# Patient Record
Sex: FEMALE | Race: ASIAN | NOT HISPANIC OR LATINO
[De-identification: names, ages, dates, MRNs, and addresses within clinical notes are randomized per-mention and may not be internally consistent; named-entity substitution may affect disease eponyms.]

---

## 2018-08-29 PROBLEM — Z00.00 ENCOUNTER FOR PREVENTIVE HEALTH EXAMINATION: Status: ACTIVE | Noted: 2018-08-29

## 2018-09-06 ENCOUNTER — APPOINTMENT (OUTPATIENT)
Dept: MRI IMAGING | Facility: HOSPITAL | Age: 73
End: 2018-09-06

## 2019-05-20 ENCOUNTER — APPOINTMENT (OUTPATIENT)
Dept: CARDIOLOGY | Facility: CLINIC | Age: 74
End: 2019-05-20
Payer: MEDICARE

## 2019-05-20 VITALS
TEMPERATURE: 98.2 F | OXYGEN SATURATION: 97 % | RESPIRATION RATE: 18 BRPM | DIASTOLIC BLOOD PRESSURE: 68 MMHG | WEIGHT: 121 LBS | HEART RATE: 64 BPM | SYSTOLIC BLOOD PRESSURE: 137 MMHG

## 2019-05-20 DIAGNOSIS — I65.29 OCCLUSION AND STENOSIS OF UNSPECIFIED CAROTID ARTERY: ICD-10-CM

## 2019-05-20 DIAGNOSIS — I10 ESSENTIAL (PRIMARY) HYPERTENSION: ICD-10-CM

## 2019-05-20 DIAGNOSIS — E78.5 HYPERLIPIDEMIA, UNSPECIFIED: ICD-10-CM

## 2019-05-20 PROCEDURE — 93000 ELECTROCARDIOGRAM COMPLETE: CPT

## 2019-05-20 PROCEDURE — 99205 OFFICE O/P NEW HI 60 MIN: CPT

## 2019-05-20 RX ORDER — AMLODIPINE BESYLATE 5 MG/1
5 TABLET ORAL
Refills: 0 | Status: ACTIVE | COMMUNITY
Start: 2019-05-20

## 2019-05-20 RX ORDER — CLOPIDOGREL 75 MG/1
75 TABLET, FILM COATED ORAL
Refills: 0 | Status: ACTIVE | COMMUNITY
Start: 2019-05-20

## 2019-05-20 RX ORDER — ATORVASTATIN CALCIUM 20 MG/1
20 TABLET, FILM COATED ORAL
Refills: 0 | Status: ACTIVE | COMMUNITY
Start: 2019-05-20

## 2019-05-20 RX ORDER — CARVEDILOL 6.25 MG/1
6.25 TABLET, FILM COATED ORAL
Refills: 0 | Status: ACTIVE | COMMUNITY
Start: 2019-05-20

## 2019-05-20 NOTE — HISTORY OF PRESENT ILLNESS
[FreeTextEntry1] : 1. HTN: on medications, controlled.\par 2. Hyperlipidemia: on statin.\par 3. Carotid stenosis: Patient underwent CEA in 2017. She has not had regular care since then.\par She reports exertional SOB, moderately severe, relieved with rest occurring on a daily basis and increasing in frequency and severity. She has very limited ET due to her SOB. She also has mild bilateral leg edema and CP that is midline, substernal without radiation lasting a few minutes. The CP occurs several times/ week and is increasing in frequency and is associated with the SOB.\par She also has had worsening dizziness.

## 2019-05-20 NOTE — PHYSICAL EXAM
[General Appearance - Well Developed] : well developed [Normal Appearance] : normal appearance [Well Groomed] : well groomed [General Appearance - Well Nourished] : well nourished [No Deformities] : no deformities [General Appearance - In No Acute Distress] : no acute distress [Normal Conjunctiva] : the conjunctiva exhibited no abnormalities [Eyelids - No Xanthelasma] : the eyelids demonstrated no xanthelasmas [Normal Oral Mucosa] : normal oral mucosa [No Oral Pallor] : no oral pallor [No Oral Cyanosis] : no oral cyanosis [Normal Jugular Venous A Waves Present] : normal jugular venous A waves present [Normal Jugular Venous V Waves Present] : normal jugular venous V waves present [No Jugular Venous Penny A Waves] : no jugular venous penny A waves [Heart Rate And Rhythm] : heart rate and rhythm were normal [Heart Sounds] : normal S1 and S2 [FreeTextEntry1] : 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  [Respiration, Rhythm And Depth] : normal respiratory rhythm and effort [Exaggerated Use Of Accessory Muscles For Inspiration] : no accessory muscle use [Auscultation Breath Sounds / Voice Sounds] : lungs were clear to auscultation bilaterally [Abdomen Soft] : soft [Abdomen Tenderness] : non-tender [Abdomen Mass (___ Cm)] : no abdominal mass palpated [Nail Clubbing] : no clubbing of the fingernails [Cyanosis, Localized] : no localized cyanosis [Petechial Hemorrhages (___cm)] : no petechial hemorrhages [Skin Color & Pigmentation] : normal skin color and pigmentation [] : no rash [No Venous Stasis] : no venous stasis [Skin Lesions] : no skin lesions [No Skin Ulcers] : no skin ulcer [No Xanthoma] : no  xanthoma was observed [Oriented To Time, Place, And Person] : oriented to person, place, and time [Affect] : the affect was normal [Mood] : the mood was normal [No Anxiety] : not feeling anxious

## 2019-05-20 NOTE — REASON FOR VISIT
WDL [Initial Evaluation] : an initial evaluation of [Hyperlipidemia] : hyperlipidemia [Hypertension] : hypertension [FreeTextEntry1] : 73 HTN hyperlipidemia DM carotid stenosis s/p CEA with Cp and SOB.

## 2019-05-20 NOTE — DISCUSSION/SUMMARY
[FreeTextEntry1] : 73 F HTN hyperlipidemia DM with carotid stenosis s/p CEA with CP and SOB.\par CHECK ECHOCARDIOGRAM.\par CHECK NST TO ASSESS PERFUSION (limited ET).\par CHECK CAROTID US.\par Continue meds for  HTN and hyperlipidemia.\par Continue medications for DM.\par COndition deteriorating, see me after tests.

## 2019-07-12 ENCOUNTER — APPOINTMENT (OUTPATIENT)
Dept: CARDIOLOGY | Facility: CLINIC | Age: 74
End: 2019-07-12
Payer: MEDICARE

## 2019-07-12 DIAGNOSIS — R07.9 CHEST PAIN, UNSPECIFIED: ICD-10-CM

## 2019-07-12 PROCEDURE — A9500: CPT

## 2019-07-12 PROCEDURE — 78452 HT MUSCLE IMAGE SPECT MULT: CPT

## 2019-07-12 PROCEDURE — 93015 CV STRESS TEST SUPVJ I&R: CPT

## 2019-07-12 PROCEDURE — 93306 TTE W/DOPPLER COMPLETE: CPT

## 2019-07-22 ENCOUNTER — APPOINTMENT (OUTPATIENT)
Dept: CARDIOLOGY | Facility: CLINIC | Age: 74
End: 2019-07-22
Payer: MEDICARE

## 2019-07-22 ENCOUNTER — NON-APPOINTMENT (OUTPATIENT)
Age: 74
End: 2019-07-22

## 2019-07-22 VITALS
RESPIRATION RATE: 18 BRPM | TEMPERATURE: 97.8 F | SYSTOLIC BLOOD PRESSURE: 120 MMHG | OXYGEN SATURATION: 99 % | DIASTOLIC BLOOD PRESSURE: 65 MMHG | HEART RATE: 58 BPM | WEIGHT: 121 LBS

## 2019-07-22 PROCEDURE — 93000 ELECTROCARDIOGRAM COMPLETE: CPT

## 2019-07-22 PROCEDURE — 99214 OFFICE O/P EST MOD 30 MIN: CPT

## 2019-07-22 RX ORDER — NITROGLYCERIN 0.4 MG/1
0.4 TABLET SUBLINGUAL
Qty: 100 | Refills: 0 | Status: ACTIVE | COMMUNITY
Start: 2019-07-22 | End: 1900-01-01

## 2019-07-22 NOTE — REASON FOR VISIT
[Follow-Up - Clinic] : a clinic follow-up of [Carotid Artery Stenosis] : carotid stenosis [Hyperlipidemia] : hyperlipidemia [Hypertension] : hypertension [FreeTextEntry1] : 73 HTN hyperlipidemia DM carotid stenosis s/p CEA for f/u.

## 2019-07-22 NOTE — PHYSICAL EXAM
[General Appearance - Well Developed] : well developed [Normal Appearance] : normal appearance [Well Groomed] : well groomed [General Appearance - Well Nourished] : well nourished [No Deformities] : no deformities [General Appearance - In No Acute Distress] : no acute distress [Normal Conjunctiva] : the conjunctiva exhibited no abnormalities [Eyelids - No Xanthelasma] : the eyelids demonstrated no xanthelasmas [Normal Oral Mucosa] : normal oral mucosa [No Oral Pallor] : no oral pallor [No Oral Cyanosis] : no oral cyanosis [Normal Jugular Venous A Waves Present] : normal jugular venous A waves present [Normal Jugular Venous V Waves Present] : normal jugular venous V waves present [No Jugular Venous Penny A Waves] : no jugular venous penny A waves [Respiration, Rhythm And Depth] : normal respiratory rhythm and effort [Auscultation Breath Sounds / Voice Sounds] : lungs were clear to auscultation bilaterally [Exaggerated Use Of Accessory Muscles For Inspiration] : no accessory muscle use [Heart Rate And Rhythm] : heart rate and rhythm were normal [Heart Sounds] : normal S1 and S2 [FreeTextEntry1] : 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  [Abdomen Soft] : soft [Abdomen Tenderness] : non-tender [Abdomen Mass (___ Cm)] : no abdominal mass palpated [Nail Clubbing] : no clubbing of the fingernails [Cyanosis, Localized] : no localized cyanosis [Petechial Hemorrhages (___cm)] : no petechial hemorrhages [Skin Color & Pigmentation] : normal skin color and pigmentation [] : no rash [No Venous Stasis] : no venous stasis [Skin Lesions] : no skin lesions [No Skin Ulcers] : no skin ulcer [No Xanthoma] : no  xanthoma was observed [Affect] : the affect was normal [Oriented To Time, Place, And Person] : oriented to person, place, and time [Mood] : the mood was normal [No Anxiety] : not feeling anxious

## 2019-07-22 NOTE — HISTORY OF PRESENT ILLNESS
[FreeTextEntry1] : 1. HTN: on medications, controlled. No SE noted.\par 2. Hyperlipidemia: on statin.\par 3. Carotid stenosis: Patient underwent CEA in 2017. She has not had regular care since then.\par Echo last visit showed normal LVEF. NST shows fixed defect.\par Patient reports improvement in CP and SOB.\par Carotid US shows MARTHA 50-69%.\par

## 2019-07-22 NOTE — DISCUSSION/SUMMARY
[FreeTextEntry1] : 73 F HTN hyperlipidemia carotid stenosis s/p CEA with apical defect on NST.\par Patient reports no symptoms. Monitor for now.\par Continue ASA.\par Continue meds for HTN and hyperlipidemia.

## 2019-09-26 ENCOUNTER — APPOINTMENT (OUTPATIENT)
Dept: CARDIOLOGY | Facility: CLINIC | Age: 74
End: 2019-09-26
Payer: MEDICARE

## 2019-09-26 VITALS
HEART RATE: 74 BPM | DIASTOLIC BLOOD PRESSURE: 59 MMHG | SYSTOLIC BLOOD PRESSURE: 99 MMHG | OXYGEN SATURATION: 96 % | RESPIRATION RATE: 18 BRPM | TEMPERATURE: 97.9 F | WEIGHT: 124 LBS

## 2019-09-26 PROCEDURE — 99215 OFFICE O/P EST HI 40 MIN: CPT

## 2019-09-26 PROCEDURE — 93000 ELECTROCARDIOGRAM COMPLETE: CPT

## 2019-09-26 NOTE — REASON FOR VISIT
[Follow-Up - Clinic] : a clinic follow-up of [Hyperlipidemia] : hyperlipidemia [Hypertension] : hypertension [Carotid Artery Stenosis] : carotid stenosis [FreeTextEntry1] : 74 HTN hyperlipidemia DM carotid stenosis s/p CEA for f/u. \par  \par

## 2019-09-26 NOTE — DISCUSSION/SUMMARY
[FreeTextEntry1] : 74 F HTN hyperlipidemia carotid stenosis s/p CEA with CP consistent with progressive angina.\par REFER FOR CARDIAC CATHETERIZATION STAT.\par Continue ASA and statin for CAD.\par Continue medications for HTN and hyperlipidemia.

## 2019-09-26 NOTE — HISTORY OF PRESENT ILLNESS
[FreeTextEntry1] : 1. HTN: on medications, compliant with medications.\par 2. Hyperlipidemia: on statin. No muscle pain is noted. \par 3. Carotid stenosis: Patient underwent CEA in 2017. She has not had regular care since then.\par Echo last visit showed normal LVEF. \par Patient has had exertional CP and SOB over the last few months. She was started on medical therapy including ASA BB and statin and NG. She continues to have CP that is relieved with rest and progressively worsening. \par \par

## 2019-09-26 NOTE — PHYSICAL EXAM
[Normal Appearance] : normal appearance [General Appearance - Well Developed] : well developed [Well Groomed] : well groomed [No Deformities] : no deformities [General Appearance - Well Nourished] : well nourished [Normal Conjunctiva] : the conjunctiva exhibited no abnormalities [General Appearance - In No Acute Distress] : no acute distress [Eyelids - No Xanthelasma] : the eyelids demonstrated no xanthelasmas [No Oral Pallor] : no oral pallor [Normal Oral Mucosa] : normal oral mucosa [No Oral Cyanosis] : no oral cyanosis [Normal Jugular Venous A Waves Present] : normal jugular venous A waves present [Normal Jugular Venous V Waves Present] : normal jugular venous V waves present [No Jugular Venous Penny A Waves] : no jugular venous penny A waves [Exaggerated Use Of Accessory Muscles For Inspiration] : no accessory muscle use [Respiration, Rhythm And Depth] : normal respiratory rhythm and effort [Auscultation Breath Sounds / Voice Sounds] : lungs were clear to auscultation bilaterally [Heart Rate And Rhythm] : heart rate and rhythm were normal [Heart Sounds] : normal S1 and S2 [FreeTextEntry1] : 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  [Abdomen Soft] : soft [Abdomen Tenderness] : non-tender [Abdomen Mass (___ Cm)] : no abdominal mass palpated [Cyanosis, Localized] : no localized cyanosis [Nail Clubbing] : no clubbing of the fingernails [Petechial Hemorrhages (___cm)] : no petechial hemorrhages [] : no rash [Skin Color & Pigmentation] : normal skin color and pigmentation [No Venous Stasis] : no venous stasis [Skin Lesions] : no skin lesions [No Xanthoma] : no  xanthoma was observed [No Skin Ulcers] : no skin ulcer [Affect] : the affect was normal [Oriented To Time, Place, And Person] : oriented to person, place, and time [No Anxiety] : not feeling anxious [Mood] : the mood was normal

## 2019-09-26 NOTE — REVIEW OF SYSTEMS
[Shortness Of Breath] : shortness of breath [Chest Pain] : chest pain [Dyspnea on exertion] : dyspnea during exertion [Negative] : Heme/Lymph

## 2019-10-03 VITALS
HEART RATE: 55 BPM | RESPIRATION RATE: 16 BRPM | DIASTOLIC BLOOD PRESSURE: 66 MMHG | HEIGHT: 61 IN | SYSTOLIC BLOOD PRESSURE: 143 MMHG | TEMPERATURE: 98 F | OXYGEN SATURATION: 97 % | WEIGHT: 119.93 LBS

## 2019-10-03 RX ORDER — CHLORHEXIDINE GLUCONATE 213 G/1000ML
1 SOLUTION TOPICAL ONCE
Refills: 0 | Status: DISCONTINUED | OUTPATIENT
Start: 2019-10-04 | End: 2019-10-04

## 2019-10-03 NOTE — H&P ADULT - NSICDXPASTMEDICALHX_GEN_ALL_CORE_FT
PAST MEDICAL HISTORY:  DM (diabetes mellitus)     History of CEA (carotid endarterectomy)     HLD (hyperlipidemia)     HTN (hypertension)

## 2019-10-03 NOTE — H&P ADULT - RS GEN PE MLT RESP DETAILS PC
no intercostal retractions/no rales/good air movement/airway patent/no rhonchi/breath sounds equal/no wheezes/respirations non-labored

## 2019-10-03 NOTE — H&P ADULT - HISTORY OF PRESENT ILLNESS
74 y o m with PMH of carotid stenosis s/p CEA in 2017, HLD, HTN, DM presented to his doctor c/p CP and SOB which is relived at rest. Pt denies dizziness, syncope, Please review meds    The history was taken with help of [employer]  # [id number]    74 y o female Wol[place] Speaking Poor Historian with PMH of carotid stenosis s/p CEA in 2017, HLD, HTN, DM presented to his doctor c/p BLAND with going up less than one flight of stairs with accompanied palpitations and dizziness. Pt denies CP, syncope, PND/orthopnea, LE edema, n/v, fever/chills, blood in the stool. Pt underwent NST on 07/12/19 which indicated small mild defects in distal inferior and inferoapical walls that are fixed, possible attenuation artifacts, the myocardial perfusion is normal with EF of 70%. In light of pt's risk factors, symptoms mentioned above and abnormal NST, pt is now referred to [facility] for recommended cardiac cath with possible intervention. History was taken with help of [employer]  # [id number]    74 y o female [place] Speaking Poor Historian with PMH of carotid stenosis s/p CEA in 2017, HLD, HTN, DM who presented to his doctor c/p BLAND with going up less than one flight of stairs with accompanied palpitations and dizziness. Pt denies CP, syncope, PND/orthopnea, LE edema, n/v, fever/chills, blood in the stool. Pt underwent NST on 07/12/19 which indicated small mild defects in distal inferior and inferoapical walls that are fixed, possible attenuation artifacts, the myocardial perfusion is normal with EF of 70%. In light of pt's risk factors, symptoms mentioned above and abnormal NST, pt is now referred to [facility] for recommended cardiac cath with possible intervention.

## 2019-10-03 NOTE — H&P ADULT - ASSESSMENT
74 y o female Macedonian Speaking Poor Historian with PMH of carotid stenosis s/p CEA in 2017, HLD, HTN, DM who presents to [facility] for recommneded cardiac catheterization with possible intervention if clinically indicated, in light of pts risk factors, CCS class III anginal symptoms and abnormal NST.    -ASA 3, Mallampati 3  -Pt compliant with home Plavix 75mg, last dose yesterday. Will give pt home dose of Plavix and load with ASA 325mg prior to cath  -IVF Hydration NS @ 75cc/hr  -precath consented via [employer]  #[id number]    Risks & benefits of procedure and alternative therapy have been explained to the patient including but not limited to: allergic reaction, bleeding w/possible need for blood transfusion, infection, renal and vascular compromise, limb damage, arrhythmia, stroke, vessel dissection/perforation, Myocardial infarction, emergent CABG. Informed consent obtained and in chart.

## 2019-10-03 NOTE — H&P ADULT - NSHPLABSRESULTS_GEN_ALL_CORE
12.7   6.42  )-----------( 263      ( 04 Oct 2019 10:07 )             38.6         PT/INR - ( 04 Oct 2019 10:07 )   PT: 11.0 sec;   INR: 0.97          PTT - ( 04 Oct 2019 10:07 )  PTT:32.6 sec    CARDIAC MARKERS ( 04 Oct 2019 10:07 )  x     / x     / x     / x     / <1.0 ng/mL        EKG: sinus bradycardia, 54bpm, no ST-T changes

## 2019-10-04 ENCOUNTER — OUTPATIENT (OUTPATIENT)
Dept: OUTPATIENT SERVICES | Facility: HOSPITAL | Age: 74
LOS: 1 days | Discharge: NURSING, REHAB OR LONG-TERM CARE | End: 2019-10-04
Payer: MEDICARE

## 2019-10-04 DIAGNOSIS — Z98.890 OTHER SPECIFIED POSTPROCEDURAL STATES: Chronic | ICD-10-CM

## 2019-10-04 LAB
ALBUMIN SERPL ELPH-MCNC: 5 G/DL — SIGNIFICANT CHANGE UP (ref 3.3–5)
ALP SERPL-CCNC: 59 U/L — SIGNIFICANT CHANGE UP (ref 40–120)
ALT FLD-CCNC: 8 U/L — LOW (ref 10–45)
ANION GAP SERPL CALC-SCNC: 13 MMOL/L — SIGNIFICANT CHANGE UP (ref 5–17)
APTT BLD: 32.6 SEC — SIGNIFICANT CHANGE UP (ref 27.5–36.3)
AST SERPL-CCNC: 11 U/L — SIGNIFICANT CHANGE UP (ref 10–40)
BASOPHILS # BLD AUTO: 0.05 K/UL — SIGNIFICANT CHANGE UP (ref 0–0.2)
BASOPHILS NFR BLD AUTO: 0.8 % — SIGNIFICANT CHANGE UP (ref 0–2)
BILIRUB SERPL-MCNC: 0.7 MG/DL — SIGNIFICANT CHANGE UP (ref 0.2–1.2)
BUN SERPL-MCNC: 24 MG/DL — HIGH (ref 7–23)
CALCIUM SERPL-MCNC: 9.9 MG/DL — SIGNIFICANT CHANGE UP (ref 8.4–10.5)
CHLORIDE SERPL-SCNC: 106 MMOL/L — SIGNIFICANT CHANGE UP (ref 96–108)
CHOLEST SERPL-MCNC: 113 MG/DL — SIGNIFICANT CHANGE UP (ref 10–199)
CK MB CFR SERPL CALC: <1 NG/ML — SIGNIFICANT CHANGE UP (ref 0–6.7)
CK SERPL-CCNC: 66 U/L — SIGNIFICANT CHANGE UP (ref 25–170)
CO2 SERPL-SCNC: 24 MMOL/L — SIGNIFICANT CHANGE UP (ref 22–31)
CREAT SERPL-MCNC: 1.03 MG/DL — SIGNIFICANT CHANGE UP (ref 0.5–1.3)
CRP SERPL-MCNC: 0.03 MG/DL — SIGNIFICANT CHANGE UP (ref 0–0.4)
EOSINOPHIL # BLD AUTO: 0.08 K/UL — SIGNIFICANT CHANGE UP (ref 0–0.5)
EOSINOPHIL NFR BLD AUTO: 1.2 % — SIGNIFICANT CHANGE UP (ref 0–6)
GLUCOSE SERPL-MCNC: 128 MG/DL — HIGH (ref 70–99)
HBA1C BLD-MCNC: 7.4 % — HIGH (ref 4–5.6)
HCT VFR BLD CALC: 38.6 % — SIGNIFICANT CHANGE UP (ref 34.5–45)
HDLC SERPL-MCNC: 47 MG/DL — LOW
HGB BLD-MCNC: 12.7 G/DL — SIGNIFICANT CHANGE UP (ref 11.5–15.5)
IMM GRANULOCYTES NFR BLD AUTO: 0.2 % — SIGNIFICANT CHANGE UP (ref 0–1.5)
INR BLD: 0.97 — SIGNIFICANT CHANGE UP (ref 0.88–1.16)
LIPID PNL WITH DIRECT LDL SERPL: 38 MG/DL — SIGNIFICANT CHANGE UP
LYMPHOCYTES # BLD AUTO: 1.55 K/UL — SIGNIFICANT CHANGE UP (ref 1–3.3)
LYMPHOCYTES # BLD AUTO: 24.1 % — SIGNIFICANT CHANGE UP (ref 13–44)
MCHC RBC-ENTMCNC: 32 PG — SIGNIFICANT CHANGE UP (ref 27–34)
MCHC RBC-ENTMCNC: 32.9 GM/DL — SIGNIFICANT CHANGE UP (ref 32–36)
MCV RBC AUTO: 97.2 FL — SIGNIFICANT CHANGE UP (ref 80–100)
MONOCYTES # BLD AUTO: 0.41 K/UL — SIGNIFICANT CHANGE UP (ref 0–0.9)
MONOCYTES NFR BLD AUTO: 6.4 % — SIGNIFICANT CHANGE UP (ref 2–14)
NEUTROPHILS # BLD AUTO: 4.32 K/UL — SIGNIFICANT CHANGE UP (ref 1.8–7.4)
NEUTROPHILS NFR BLD AUTO: 67.3 % — SIGNIFICANT CHANGE UP (ref 43–77)
NRBC # BLD: 0 /100 WBCS — SIGNIFICANT CHANGE UP (ref 0–0)
PLATELET # BLD AUTO: 263 K/UL — SIGNIFICANT CHANGE UP (ref 150–400)
POTASSIUM SERPL-MCNC: 4.2 MMOL/L — SIGNIFICANT CHANGE UP (ref 3.5–5.3)
POTASSIUM SERPL-SCNC: 4.2 MMOL/L — SIGNIFICANT CHANGE UP (ref 3.5–5.3)
PROT SERPL-MCNC: 8.1 G/DL — SIGNIFICANT CHANGE UP (ref 6–8.3)
PROTHROM AB SERPL-ACNC: 11 SEC — SIGNIFICANT CHANGE UP (ref 10–12.9)
RBC # BLD: 3.97 M/UL — SIGNIFICANT CHANGE UP (ref 3.8–5.2)
RBC # FLD: 11.8 % — SIGNIFICANT CHANGE UP (ref 10.3–14.5)
SODIUM SERPL-SCNC: 143 MMOL/L — SIGNIFICANT CHANGE UP (ref 135–145)
TOTAL CHOLESTEROL/HDL RATIO MEASUREMENT: 2.4 RATIO — LOW (ref 3.3–7.1)
TRIGL SERPL-MCNC: 142 MG/DL — SIGNIFICANT CHANGE UP (ref 10–149)
WBC # BLD: 6.42 K/UL — SIGNIFICANT CHANGE UP (ref 3.8–10.5)
WBC # FLD AUTO: 6.42 K/UL — SIGNIFICANT CHANGE UP (ref 3.8–10.5)

## 2019-10-04 PROCEDURE — 85730 THROMBOPLASTIN TIME PARTIAL: CPT

## 2019-10-04 PROCEDURE — 99234 HOSP IP/OBS SM DT SF/LOW 45: CPT

## 2019-10-04 PROCEDURE — C1753: CPT

## 2019-10-04 PROCEDURE — 92978 ENDOLUMINL IVUS OCT C 1ST: CPT | Mod: LC

## 2019-10-04 PROCEDURE — 80061 LIPID PANEL: CPT

## 2019-10-04 PROCEDURE — 82550 ASSAY OF CK (CPK): CPT

## 2019-10-04 PROCEDURE — 93010 ELECTROCARDIOGRAM REPORT: CPT

## 2019-10-04 PROCEDURE — 86140 C-REACTIVE PROTEIN: CPT

## 2019-10-04 PROCEDURE — C1887: CPT

## 2019-10-04 PROCEDURE — 82962 GLUCOSE BLOOD TEST: CPT

## 2019-10-04 PROCEDURE — 85610 PROTHROMBIN TIME: CPT

## 2019-10-04 PROCEDURE — C1769: CPT

## 2019-10-04 PROCEDURE — 93458 L HRT ARTERY/VENTRICLE ANGIO: CPT | Mod: 26

## 2019-10-04 PROCEDURE — 83036 HEMOGLOBIN GLYCOSYLATED A1C: CPT

## 2019-10-04 PROCEDURE — 92978 ENDOLUMINL IVUS OCT C 1ST: CPT | Mod: 26,LC

## 2019-10-04 PROCEDURE — 80053 COMPREHEN METABOLIC PANEL: CPT

## 2019-10-04 PROCEDURE — 93005 ELECTROCARDIOGRAM TRACING: CPT

## 2019-10-04 PROCEDURE — 93458 L HRT ARTERY/VENTRICLE ANGIO: CPT

## 2019-10-04 PROCEDURE — 82553 CREATINE MB FRACTION: CPT

## 2019-10-04 PROCEDURE — 85025 COMPLETE CBC W/AUTO DIFF WBC: CPT

## 2019-10-04 RX ORDER — ASPIRIN/CALCIUM CARB/MAGNESIUM 324 MG
325 TABLET ORAL ONCE
Refills: 0 | Status: COMPLETED | OUTPATIENT
Start: 2019-10-04 | End: 2019-10-04

## 2019-10-04 RX ORDER — CLOPIDOGREL BISULFATE 75 MG/1
75 TABLET, FILM COATED ORAL ONCE
Refills: 0 | Status: COMPLETED | OUTPATIENT
Start: 2019-10-04 | End: 2019-10-04

## 2019-10-04 RX ORDER — CARVEDILOL PHOSPHATE 80 MG/1
1 CAPSULE, EXTENDED RELEASE ORAL
Qty: 0 | Refills: 0 | DISCHARGE

## 2019-10-04 RX ORDER — SODIUM CHLORIDE 9 MG/ML
500 INJECTION INTRAMUSCULAR; INTRAVENOUS; SUBCUTANEOUS
Refills: 0 | Status: DISCONTINUED | OUTPATIENT
Start: 2019-10-04 | End: 2019-10-04

## 2019-10-04 RX ORDER — AMLODIPINE BESYLATE 2.5 MG/1
1 TABLET ORAL
Qty: 0 | Refills: 0 | DISCHARGE

## 2019-10-04 RX ORDER — LOSARTAN POTASSIUM 100 MG/1
1 TABLET, FILM COATED ORAL
Qty: 0 | Refills: 0 | DISCHARGE

## 2019-10-04 RX ORDER — ATORVASTATIN CALCIUM 80 MG/1
1 TABLET, FILM COATED ORAL
Qty: 0 | Refills: 0 | DISCHARGE

## 2019-10-04 RX ORDER — EMPAGLIFLOZIN 10 MG/1
1 TABLET, FILM COATED ORAL
Qty: 0 | Refills: 0 | DISCHARGE

## 2019-10-04 RX ORDER — SITAGLIPTIN AND METFORMIN HYDROCHLORIDE 500; 50 MG/1; MG/1
1 TABLET, FILM COATED ORAL
Qty: 0 | Refills: 0 | DISCHARGE

## 2019-10-04 RX ORDER — CLOPIDOGREL BISULFATE 75 MG/1
1 TABLET, FILM COATED ORAL
Qty: 0 | Refills: 0 | DISCHARGE

## 2019-10-04 RX ADMIN — Medication 325 MILLIGRAM(S): at 11:01

## 2019-10-04 RX ADMIN — SODIUM CHLORIDE 75 MILLILITER(S): 9 INJECTION INTRAMUSCULAR; INTRAVENOUS; SUBCUTANEOUS at 11:01

## 2019-10-04 RX ADMIN — CLOPIDOGREL BISULFATE 75 MILLIGRAM(S): 75 TABLET, FILM COATED ORAL at 11:01

## 2019-10-04 NOTE — PROGRESS NOTE ADULT - SUBJECTIVE AND OBJECTIVE BOX
Interventional Cardiology PA SDA Discharge Note    Patient without complaints. Ambulated and voided without difficulties    Afebrile, VSS    Ext:    		Right    Radial : no   hematoma, no  bleeding, dressing; C/D/I      Pulses:    intact RAD to baseline     A/P:    74 y o female Sinhala Speaking Poor Historian with PMH of carotid stenosis s/p CEA in 2017, HLD, HTN, DM who presented to his doctor c/p BLAND with going up less than one flight of stairs with accompanied palpitations and dizziness. Pt denies CP, syncope, PND/orthopnea, LE edema, n/v, fever/chills, blood in the stool. Pt underwent NST on 07/12/19 which indicated small mild defects in distal inferior and inferoapical walls that are fixed, possible attenuation artifacts, the myocardial perfusion is normal with EF of 70%. In light of pt's risk factors, symptoms mentioned above and abnormal NST, pt is now referred to [facility] for recommended cardiac cath with possible intervention.   -s/p Dx CATH: 1vCAD EF 75%, 50-60% OM1 IVUS mLA >4.  Rt Radial TR Band.                1.	Stable for discharge as per attending Dr. Palomino________ after bed rest, pt voids, wrist stable and 30 minutes of ambulation.  2.	Follow-up with PMD/Cardiologist __Dr Casillas_________ in 1-2 weeks  3.	Discharged forms signed and copies in chart   4.         Pt instructed to restart Metformin on 10/7/19

## 2019-10-07 ENCOUNTER — APPOINTMENT (OUTPATIENT)
Dept: CARDIOLOGY | Facility: CLINIC | Age: 74
End: 2019-10-07
Payer: MEDICARE

## 2019-10-07 ENCOUNTER — NON-APPOINTMENT (OUTPATIENT)
Age: 74
End: 2019-10-07

## 2019-10-07 VITALS
RESPIRATION RATE: 17 BRPM | SYSTOLIC BLOOD PRESSURE: 106 MMHG | DIASTOLIC BLOOD PRESSURE: 58 MMHG | BODY MASS INDEX: 23.03 KG/M2 | OXYGEN SATURATION: 99 % | HEART RATE: 56 BPM | WEIGHT: 122 LBS | TEMPERATURE: 97.6 F | HEIGHT: 61 IN

## 2019-10-07 PROBLEM — E78.5 HYPERLIPIDEMIA, UNSPECIFIED: Chronic | Status: ACTIVE | Noted: 2019-10-03

## 2019-10-07 PROBLEM — E11.9 TYPE 2 DIABETES MELLITUS WITHOUT COMPLICATIONS: Chronic | Status: ACTIVE | Noted: 2019-10-03

## 2019-10-07 PROBLEM — Z98.890 OTHER SPECIFIED POSTPROCEDURAL STATES: Chronic | Status: ACTIVE | Noted: 2019-10-03

## 2019-10-07 PROBLEM — I10 ESSENTIAL (PRIMARY) HYPERTENSION: Chronic | Status: ACTIVE | Noted: 2019-10-03

## 2019-10-07 PROCEDURE — 99214 OFFICE O/P EST MOD 30 MIN: CPT

## 2019-10-07 PROCEDURE — 93000 ELECTROCARDIOGRAM COMPLETE: CPT

## 2019-10-07 RX ORDER — AMLODIPINE BESYLATE 10 MG/1
10 TABLET ORAL
Qty: 90 | Refills: 0 | Status: ACTIVE | COMMUNITY
Start: 2019-09-18

## 2019-10-07 RX ORDER — DAPAGLIFLOZIN 5 MG/1
5 TABLET, FILM COATED ORAL
Qty: 30 | Refills: 0 | Status: ACTIVE | COMMUNITY
Start: 2018-06-15

## 2019-10-07 RX ORDER — SITAGLIPTIN AND METFORMIN HYDROCHLORIDE 50; 500 MG/1; MG/1
50-500 TABLET, FILM COATED, EXTENDED RELEASE ORAL
Qty: 60 | Refills: 0 | Status: ACTIVE | COMMUNITY
Start: 2019-04-12

## 2019-10-07 RX ORDER — SITAGLIPTIN AND METFORMIN HYDROCHLORIDE 50; 1000 MG/1; MG/1
50-1000 TABLET, FILM COATED ORAL
Qty: 60 | Refills: 0 | Status: ACTIVE | COMMUNITY
Start: 2019-08-08

## 2019-10-07 RX ORDER — EMPAGLIFLOZIN 10 MG/1
10 TABLET, FILM COATED ORAL
Qty: 30 | Refills: 0 | Status: ACTIVE | COMMUNITY
Start: 2019-06-12

## 2019-10-07 NOTE — PHYSICAL EXAM
[Normal Appearance] : normal appearance [General Appearance - Well Developed] : well developed [Well Groomed] : well groomed [General Appearance - Well Nourished] : well nourished [General Appearance - In No Acute Distress] : no acute distress [No Deformities] : no deformities [Normal Conjunctiva] : the conjunctiva exhibited no abnormalities [Eyelids - No Xanthelasma] : the eyelids demonstrated no xanthelasmas [Normal Oral Mucosa] : normal oral mucosa [No Oral Pallor] : no oral pallor [Normal Jugular Venous A Waves Present] : normal jugular venous A waves present [No Oral Cyanosis] : no oral cyanosis [No Jugular Venous Penny A Waves] : no jugular venous penny A waves [Normal Jugular Venous V Waves Present] : normal jugular venous V waves present [Auscultation Breath Sounds / Voice Sounds] : lungs were clear to auscultation bilaterally [Respiration, Rhythm And Depth] : normal respiratory rhythm and effort [Exaggerated Use Of Accessory Muscles For Inspiration] : no accessory muscle use [Heart Rate And Rhythm] : heart rate and rhythm were normal [Heart Sounds] : normal S1 and S2 [Abdomen Soft] : soft [Abdomen Tenderness] : non-tender [Abdomen Mass (___ Cm)] : no abdominal mass palpated [Cyanosis, Localized] : no localized cyanosis [Nail Clubbing] : no clubbing of the fingernails [Petechial Hemorrhages (___cm)] : no petechial hemorrhages [] : no rash [Skin Color & Pigmentation] : normal skin color and pigmentation [Skin Lesions] : no skin lesions [No Venous Stasis] : no venous stasis [No Skin Ulcers] : no skin ulcer [No Xanthoma] : no  xanthoma was observed [Oriented To Time, Place, And Person] : oriented to person, place, and time [Mood] : the mood was normal [Affect] : the affect was normal [No Anxiety] : not feeling anxious [FreeTextEntry1] : 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

## 2019-10-07 NOTE — REASON FOR VISIT
[Follow-Up - Clinic] : a clinic follow-up of [Carotid Artery Stenosis] : carotid stenosis [Coronary Artery Disease] : coronary artery disease [Hyperlipidemia] : hyperlipidemia [Hypertension] : hypertension [FreeTextEntry1] : 74 HTN hyperlipidemia DM carotid stenosis s/p CEA for f/u. \par

## 2019-10-07 NOTE — HISTORY OF PRESENT ILLNESS
[FreeTextEntry1] : 1. HTN: on medications, no SE noted. \par 2. Hyperlipidemia: on statin. LIpid profile is followed by PMD. \par 3. Carotid stenosis: Patient underwent CEA in 2017.  \par 4: CAD: s/p cath. Echo last visit showed normal LVEF. \par Patient is on plavix and BB. Cath showed non-obstructive CAD. She denies CP.

## 2019-10-07 NOTE — DISCUSSION/SUMMARY
[FreeTextEntry1] : 74 F HTN hyperlipidemia carotid stenosis s/p CEA CAD for f/u.\par Continue plavix.\par Continue medications for HTN and hyperlipidemia.\par Monitor for symptoms.

## 2019-10-18 RX ORDER — LOSARTAN POTASSIUM 100 MG/1
100 TABLET, FILM COATED ORAL DAILY
Qty: 30 | Refills: 5 | Status: ACTIVE | COMMUNITY
Start: 2019-05-20 | End: 1900-01-01

## 2019-12-05 ENCOUNTER — APPOINTMENT (OUTPATIENT)
Dept: CARDIOLOGY | Facility: CLINIC | Age: 74
End: 2019-12-05

## 2020-09-15 ENCOUNTER — APPOINTMENT (OUTPATIENT)
Dept: HEART AND VASCULAR | Facility: CLINIC | Age: 75
End: 2020-09-15
Payer: COMMERCIAL

## 2020-09-15 PROCEDURE — 99442: CPT

## 2020-09-15 NOTE — PLAN
[FreeTextEntry1] : 1. Continue medications for HTN.\par 2. Continue statin for hyperlipidemia.\par 3. Symptom monitoring and social distancing.

## 2020-09-15 NOTE — HISTORY OF PRESENT ILLNESS
[Home] : at home, [unfilled] , at the time of the visit. [Medical Office: ([facility])___] : at the medical office located in  [Verbal consent obtained from patient] : the patient, [unfilled] [FreeTextEntry1] : 75  HTN hyperlipidemia DM carotid stenosis s/p CEA for f/u. \par \par 1. HTN: on medications, compliant with medications.\par 2. Hyperlipidemia: on statin. No SE noted. \par 3. Carotid stenosis: Patient underwent CEA in 2017. \par 4: CAD: s/p cath. Echo last visit showed normal LVEF. \par Patient is on plavix and BB. Cath showed non-obstructive CAD. \par Patient denies CP or SOB. No cough or fevers noted. SHe is compliant with medications. [Time Spent: ___ minutes] : I have spent [unfilled] minutes with the patient on the telephone

## 2020-10-01 ENCOUNTER — APPOINTMENT (OUTPATIENT)
Dept: CARDIOLOGY | Facility: CLINIC | Age: 75
End: 2020-10-01

## 2020-10-02 ENCOUNTER — APPOINTMENT (OUTPATIENT)
Dept: HEART AND VASCULAR | Facility: CLINIC | Age: 75
End: 2020-10-02
Payer: COMMERCIAL

## 2020-10-02 PROCEDURE — 99442: CPT

## 2020-10-02 NOTE — HISTORY OF PRESENT ILLNESS
[Home] : at home, [unfilled] , at the time of the visit. [Medical Office: ([facility])___] : at the medical office located in  [Verbal consent obtained from patient] : the patient, [unfilled] [FreeTextEntry1] : 75 HTN hyperlipidemia DM carotid stenosis s/p CEA for f/u. \par \par  \par History of Present Illness\par 1. HTN: on medications, compliant with medications.\par 2. Hyperlipidemia: on statin. No muscle pain is noted.\par 3. Carotid stenosis: Patient underwent CEA in 2017. \par 4: CAD: s/p cath. Echo last visit showed normal LVEF. \par Patient is on plavix and BB. Cath showed non-obstructive CAD.\par Patient denies CP or SOB. No cough or fevers noted. ET is stable.\par \par EKG: 10-7-19 NSR nl axis no ST changes  \par Echo: 7-2019   \par Cardiac Cath:  \par  [Time Spent: ___ minutes] : I have spent [unfilled] minutes with the patient on the telephone

## 2020-10-26 ENCOUNTER — APPOINTMENT (OUTPATIENT)
Dept: CARDIOLOGY | Facility: CLINIC | Age: 75
End: 2020-10-26

## 2020-11-12 ENCOUNTER — APPOINTMENT (OUTPATIENT)
Dept: CARDIOLOGY | Facility: CLINIC | Age: 75
End: 2020-11-12
Payer: MEDICARE

## 2020-11-12 ENCOUNTER — NON-APPOINTMENT (OUTPATIENT)
Age: 75
End: 2020-11-12

## 2020-11-12 VITALS
WEIGHT: 122 LBS | RESPIRATION RATE: 18 BRPM | OXYGEN SATURATION: 98 % | HEART RATE: 59 BPM | BODY MASS INDEX: 23.05 KG/M2 | TEMPERATURE: 97.8 F | SYSTOLIC BLOOD PRESSURE: 126 MMHG | DIASTOLIC BLOOD PRESSURE: 64 MMHG

## 2020-11-12 DIAGNOSIS — I25.10 ATHEROSCLEROTIC HEART DISEASE OF NATIVE CORONARY ARTERY W/OUT ANGINA PECTORIS: ICD-10-CM

## 2020-11-12 PROCEDURE — 99214 OFFICE O/P EST MOD 30 MIN: CPT

## 2020-11-12 PROCEDURE — 99072 ADDL SUPL MATRL&STAF TM PHE: CPT

## 2020-11-12 PROCEDURE — 93000 ELECTROCARDIOGRAM COMPLETE: CPT

## 2020-11-12 NOTE — HISTORY OF PRESENT ILLNESS
[FreeTextEntry1] :  \par 1. HTN: on medications, compliant with medications.\par 2. Hyperlipidemia: on statin. No muscle pain is noted. \par 3. Carotid stenosis: Patient underwent CEA in 2017. \par 4: CAD: s/p cath. Echo last visit showed normal LVEF. \par Patient is on plavix and BB. \par Patient denies CP or SOB. No cough or fevers noted. ET is stable. Cath showed non-obstructive CAD. \par She received her flu shot.

## 2020-11-12 NOTE — REASON FOR VISIT
[Follow-Up - Clinic] : a clinic follow-up of [Coronary Artery Disease] : coronary artery disease [Hyperlipidemia] : hyperlipidemia [Hypertension] : hypertension [FreeTextEntry1] : 75 HTN hyperlipidemia DM carotid stenosis s/p CEA for f/u. \par

## 2020-11-12 NOTE — DISCUSSION/SUMMARY
[FreeTextEntry1] : 75 F HTN hyperlipidemia CEA CAD for f/u.\par Monitor CAD.\par Continue plavix and statin. (PATIENT IS OFF ASA).\par Continue medications for HTN.

## 2020-11-12 NOTE — PHYSICAL EXAM
[General Appearance - Well Developed] : well developed [Normal Appearance] : normal appearance [Well Groomed] : well groomed [General Appearance - Well Nourished] : well nourished [No Deformities] : no deformities [General Appearance - In No Acute Distress] : no acute distress [Normal Conjunctiva] : the conjunctiva exhibited no abnormalities [Eyelids - No Xanthelasma] : the eyelids demonstrated no xanthelasmas [Normal Oral Mucosa] : normal oral mucosa [No Oral Pallor] : no oral pallor [No Oral Cyanosis] : no oral cyanosis [Normal Jugular Venous A Waves Present] : normal jugular venous A waves present [Normal Jugular Venous V Waves Present] : normal jugular venous V waves present [No Jugular Venous Penny A Waves] : no jugular venous penny A waves [Respiration, Rhythm And Depth] : normal respiratory rhythm and effort [Exaggerated Use Of Accessory Muscles For Inspiration] : no accessory muscle use [Auscultation Breath Sounds / Voice Sounds] : lungs were clear to auscultation bilaterally [Heart Rate And Rhythm] : heart rate and rhythm were normal [Heart Sounds] : normal S1 and S2 [FreeTextEntry1] : 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  [Abdomen Soft] : soft [Abdomen Tenderness] : non-tender [Abdomen Mass (___ Cm)] : no abdominal mass palpated [Nail Clubbing] : no clubbing of the fingernails [Cyanosis, Localized] : no localized cyanosis [Petechial Hemorrhages (___cm)] : no petechial hemorrhages [Skin Color & Pigmentation] : normal skin color and pigmentation [] : no rash [No Venous Stasis] : no venous stasis [Skin Lesions] : no skin lesions [No Skin Ulcers] : no skin ulcer [No Xanthoma] : no  xanthoma was observed [Oriented To Time, Place, And Person] : oriented to person, place, and time [Affect] : the affect was normal [Mood] : the mood was normal [No Anxiety] : not feeling anxious

## 2021-02-25 ENCOUNTER — APPOINTMENT (OUTPATIENT)
Dept: CARDIOLOGY | Facility: CLINIC | Age: 76
End: 2021-02-25